# Patient Record
Sex: FEMALE | Race: WHITE | NOT HISPANIC OR LATINO | URBAN - METROPOLITAN AREA
[De-identification: names, ages, dates, MRNs, and addresses within clinical notes are randomized per-mention and may not be internally consistent; named-entity substitution may affect disease eponyms.]

---

## 2024-02-22 ENCOUNTER — EMERGENCY (EMERGENCY)
Facility: HOSPITAL | Age: 24
LOS: 1 days | Discharge: HOME | End: 2024-02-22
Attending: EMERGENCY MEDICINE | Admitting: EMERGENCY MEDICINE
Payer: SELF-PAY

## 2024-02-22 VITALS
DIASTOLIC BLOOD PRESSURE: 72 MMHG | HEART RATE: 87 BPM | OXYGEN SATURATION: 98 % | SYSTOLIC BLOOD PRESSURE: 110 MMHG | RESPIRATION RATE: 18 BRPM | TEMPERATURE: 98 F

## 2024-02-22 DIAGNOSIS — S09.90XA UNSPECIFIED INJURY OF HEAD, INITIAL ENCOUNTER: ICD-10-CM
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
## 2024-02-22 DIAGNOSIS — F10.129 ALCOHOL ABUSE WITH INTOXICATION, UNSPECIFIED: ICD-10-CM

## 2024-02-22 PROCEDURE — 99284 EMERGENCY DEPT VISIT MOD MDM: CPT

## 2024-02-22 PROCEDURE — 70450 CT HEAD/BRAIN W/O DYE: CPT | Mod: 26

## 2024-02-22 PROCEDURE — 99053 MED SERV 10PM-8AM 24 HR FAC: CPT

## 2024-02-22 RX ORDER — ACETAMINOPHEN 500 MG
650 TABLET ORAL ONCE
Refills: 0 | Status: COMPLETED | OUTPATIENT
Start: 2024-02-22 | End: 2024-02-22

## 2024-02-22 RX ADMIN — Medication 650 MILLIGRAM(S): at 06:35

## 2024-02-22 NOTE — ED PROVIDER NOTE - PATIENT PORTAL LINK FT
You can access the FollowMyHealth Patient Portal offered by [facility] by registering at the following website: [email or web address]. By joining [facility]’s FollowMyHealth portal, you will also be able to view your health information using other applications (apps) compatible with our system.
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
## 2024-02-22 NOTE — ED PROVIDER NOTE - PROGRESS NOTE DETAILS
Patient remains neurologically intact.  Well appearing.  Informed of results.  Stable for discharge.

## 2024-02-22 NOTE — ED PROVIDER NOTE - NSFOLLOWUPINSTRUCTIONS_ED_ALL_ED_FT
Head Injury, Adult    There are many types of head injuries. Head injuries can be as minor as a small bump, or they can be a serious medical issue. More severe head injuries include:  A jarring injury to the brain (concussion).  A bruise (contusion) of the brain. This means there is bleeding in the brain that can cause swelling.  A cracked skull (skull fracture).  Bleeding in the brain that collects, clots, and forms a bump (hematoma).  After a head injury, most problems occur within the first 24 hours, but side effects may occur up to 7–10 days after the injury. It is important to watch your condition for any changes. You may need to be observed in the emergency department or urgent care, or you may be admitted to the hospital.    What are the causes?  There are many possible causes of a head injury. Serious head injuries may be caused by car accidents, bicycle or motorcycle accidents, sports injuries, falls, or being struck by an object.    What are the symptoms?  Symptoms of a head injury include a contusion, bump, or bleeding at the site of the injury. Other physical symptoms may include:  Headache.  Nausea or vomiting.  Dizziness.  Blurred or double vision.  Being uncomfortable around bright lights or loud noises.  Seizures.  Feeling tired.  Trouble being awakened.  Loss of consciousness.  Mental or emotional symptoms may include:  Irritability.  Confusion and memory problems.  Poor attention and concentration.  Changes in eating or sleeping habits.  Anxiety or depression.  How is this diagnosed?  This condition can usually be diagnosed based on your symptoms, a description of the injury, and a physical exam. You may also have imaging tests done, such as a CT scan or an MRI.    How is this treated?  Treatment for this condition depends on the severity and type of injury you have. The main goal of treatment is to prevent complications and allow the brain time to heal.    Mild head injury    If you have a mild head injury, you may be sent home, and treatment may include:  Observation. A responsible adult should stay with you for 24 hours after your injury and check on you often.  Physical rest.  Brain rest.  Pain medicines.  Severe head injury    If you have a severe head injury, treatment may include:  Close observation. This includes hospitalization with the following care:  Frequent physical exams.  Frequent checks of how your brain and nervous system are working (neurological status).  Checking your blood pressure and oxygen levels.  Medicines to relieve pain, prevent seizures, and decrease brain swelling.  Airway protection and breathing support. This may include using a ventilator.  Treatments that monitor and manage swelling inside the brain.  Brain surgery. This may be needed to:  Remove a collection of blood or blood clots.  Stop the bleeding.  Remove a part of the skull to allow room for the brain to swell.  Follow these instructions at home:  Activity    Rest and avoid activities that are physically hard or tiring.  Make sure you get enough sleep.  Let your brain rest by limiting activities that require a lot of thought or attention, such as:  Watching TV.  Playing memory games and puzzles.  Job-related work or homework.  Working on the computer, using social media, and texting.  Avoid activities that could cause another head injury, such as playing sports, until your health care provider approves. Having another head injury, especially before the first one has healed, can be dangerous.  Ask your health care provider when it is safe for you to return to your regular activities, including work or school. Ask your health care provider for a step-by-step plan for gradually returning to activities.  Ask your health care provider when you can drive, ride a bicycle, or use heavy machinery. Your ability to react may be slower after a brain injury. Do not do these activities if you are dizzy.  Lifestyle      Do not drink alcohol until your health care provider approves. Do not use drugs. Alcohol and certain drugs may slow your recovery and can put you at risk of further injury.  If it is harder than usual to remember things, write them down.  If you are easily distracted, try to do one thing at a time.  Talk with family members or close friends when making important decisions.  Tell your friends, family, a trusted colleague, and  about your injury, symptoms, and restrictions. Have them watch for any new or worsening problems.  General instructions    Take over-the-counter and prescription medicines only as told by your health care provider.  Have someone stay with you for 24 hours after your head injury. This person should watch you for any changes in your symptoms and be ready to seek medical help.  Keep all follow-up visits as told by your health care provider. This is important.  How is this prevented?  Work on improving your balance and strength to avoid falls.  Wear a seat belt when you are in a moving vehicle.  Wear a helmet when riding a bicycle, skiing, or doing any other sport or activity that has a risk of injury.  If you drink alcohol:  Limit how much you use to:  0–1 drink a day for nonpregnant women.  0–2 drinks a day for men.  Be aware of how much alcohol is in your drink. In the [place] one drink equals one 12 oz bottle of beer (355 mL), one 5 oz glass of wine (148 mL), or one 1½ oz glass of hard liquor (44 mL).  Take safety measures in your home, such as:  Removing clutter and tripping hazards from floors and stairways.  Using grab bars in bathrooms and handrails by stairs.  Placing non-slip mats on floors and in bathtubs.  Improving lighting in dim areas.  Where to find more information  Centers for Disease Control and Prevention: www.cdc.gov  Get help right away if:  You have:  A severe headache that is not helped by medicine.  Trouble walking or weakness in your arms and legs.  Clear or bloody fluid coming from your nose or ears.  Changes in your vision.  A seizure.  Increased confusion or irritability.  Your symptoms get worse.  You are sleepier than normal and have trouble staying awake.  You lose your balance.  Your pupils change size.  Your speech is slurred.  Your dizziness gets worse.  You vomit.  These symptoms may represent a serious problem that is an emergency. Do not wait to see if the symptoms will go away. Get medical help right away. Call your local emergency services (911 in the [place] Do not drive yourself to the hospital.    Summary  Head injuries can be minor, or they can be a serious medical issue requiring immediate attention.  Treatment for this condition depends on the severity and type of injury you have.  Have someone stay with you for 24 hours after your injury and check on you often.  Ask your health care provider when it is safe for you to return to your regular activities, including work or school.  Head injury prevention includes wearing a seat belt in a motor vehicle, using a helmet on a bicycle, limiting alcohol use, and taking safety measures in your home.  This information is not intended to replace advice given to you by your health care provider. Make sure you discuss any questions you have with your health care provider.    Document Revised: 10/30/2020 Document Reviewed: 10/30/2020  Elsevier Patient Education © 2023 [employer]
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
